# Patient Record
Sex: FEMALE | Race: WHITE
[De-identification: names, ages, dates, MRNs, and addresses within clinical notes are randomized per-mention and may not be internally consistent; named-entity substitution may affect disease eponyms.]

---

## 2019-11-16 ENCOUNTER — HOSPITAL ENCOUNTER (INPATIENT)
Dept: HOSPITAL 1 - ED | Age: 44
LOS: 1 days | Discharge: HOME | DRG: 445 | End: 2019-11-17
Attending: HOSPITALIST | Admitting: HOSPITALIST
Payer: COMMERCIAL

## 2019-11-16 VITALS
WEIGHT: 174.25 LBS | BODY MASS INDEX: 32.07 KG/M2 | WEIGHT: 174.25 LBS | BODY MASS INDEX: 32.07 KG/M2 | HEIGHT: 62 IN | HEIGHT: 62 IN

## 2019-11-16 VITALS — SYSTOLIC BLOOD PRESSURE: 116 MMHG | DIASTOLIC BLOOD PRESSURE: 69 MMHG

## 2019-11-16 VITALS — DIASTOLIC BLOOD PRESSURE: 77 MMHG | SYSTOLIC BLOOD PRESSURE: 116 MMHG

## 2019-11-16 VITALS — DIASTOLIC BLOOD PRESSURE: 70 MMHG | SYSTOLIC BLOOD PRESSURE: 131 MMHG

## 2019-11-16 DIAGNOSIS — E66.01: ICD-10-CM

## 2019-11-16 DIAGNOSIS — N39.0: ICD-10-CM

## 2019-11-16 DIAGNOSIS — N20.0: ICD-10-CM

## 2019-11-16 DIAGNOSIS — K80.20: Primary | ICD-10-CM

## 2019-11-16 LAB
ALBUMIN SERPL-MCNC: 3.5 G/DL (ref 3.4–5)
ALP SERPL-CCNC: 64 U/L (ref 46–116)
ALT SERPL-CCNC: 33 U/L (ref 14–59)
AST SERPL-CCNC: 15 U/L (ref 15–37)
BASOPHILS NFR BLD: 0.5 % (ref 0–2)
BILIRUB SERPL-MCNC: 0.68 MG/DL (ref 0.2–1)
BUN SERPL-MCNC: 22 MG/DL (ref 7–18)
CALCIUM SERPL-MCNC: 8.5 MG/DL (ref 8.5–10.1)
CHLORIDE SERPL-SCNC: 105 MMOL/L (ref 98–107)
CHOLEST SERPL-MCNC: 191 MG/DL (ref ?–200)
CHOLEST/HDLC SERPL: 4.4 MG/DL
CO2 SERPL-SCNC: 25.4 MMOL/L (ref 21–32)
CREAT SERPL-MCNC: 0.8 MG/DL (ref 0.6–1)
ERYTHROCYTE [DISTWIDTH] IN BLOOD BY AUTOMATED COUNT: 13.7 % (ref 11.5–14.5)
GFR SERPLBLD BASED ON 1.73 SQ M-ARVRAT: > 60 ML/MIN
GLUCOSE SERPL-MCNC: 114 MG/DL (ref 74–106)
HDLC SERPL-MCNC: 43 MG/DL (ref 40–60)
MICROSCOPIC UR-IMP: YES
PLATELET # BLD: 347 X10^3MCL (ref 130–400)
POTASSIUM SERPL-SCNC: 3.8 MMOL/L (ref 3.5–5.1)
PROT SERPL-MCNC: 7.3 G/DL (ref 6.4–8.2)
RBC # UR STRIP.AUTO: (no result) /UL
SODIUM SERPL-SCNC: 142 MMOL/L (ref 136–145)
TRIGL SERPL-MCNC: 105 MG/DL (ref ?–150)
UA SPECIFIC GRAVITY: >=1.03 (ref 1–1.03)

## 2019-11-16 PROCEDURE — G0378 HOSPITAL OBSERVATION PER HR: HCPCS

## 2019-11-16 PROCEDURE — C9113 INJ PANTOPRAZOLE SODIUM, VIA: HCPCS

## 2019-11-16 NOTE — NUR
RECEIVED PT FROM PREVIOUS SHIFT. PT A/OX4. DENIES SOB ON RA. C/O 10/10 PAIN.
DR MADE AWARE. IV PATENT, INFUSING WELL WITH NO S/S OF INFILTRATION. 
AT BEDSIDE. CALL LIGHT WITHIN REACH, BED IN LOW POSITION. WILL CONTINUE TO
MONITOR.

## 2019-11-16 NOTE — NUR
RECEIVED PT FROM ED. PT AOX4, FOLLOWS COMMANDS, SPEECH CLEAR, PERRLA, DENIES
HEADACHE, DROWSY AT THIS TIME DUE TO PAIN MEDS RECEIVED IN ED. RESP E/U ON RA,
EQUAL CHEST RISE, LUNG SOUNDS CTA, DENIES SOB. HR: 73, S1 AND S2 WNL, DENIES
CHEST PAIN OR PALPITATIONS. ABD SOFT/ROUND/NONTENDER, BS ACTIVE, DENIES N/V.
PT C/O OF PAIN TO R FLANK 6/10, MEDICATED AS ORDERED PER EMAR, DENIES PAINFUL
OR BURNING URINE. SMALL SX INCISION TO L FOOT W/ CLEAR DRESSING CDI, HX OF
FOOT SX ONE MONTH AGO. IV TO LAC W/ NO ERYTHEMA OR EDEMA. BED IN LOWEST
POSITION AND CALL LIGHT WITHIN REACH. WILL CONTINUE TO MONITOR.

## 2019-11-16 NOTE — NUR
PATIENT RESTING IN BED WITH C/O INTERMITTENT SHARP PAIN IN RIGHT LOWER BACK. MD
IFORMED O PATIENT STAUS. ADVISED HE WILL REVIEW CT RESULTS. PATIENT INFORMED OF
MD PLAN OF CARE. VSS. FAMILY AT BEDSIDE. BED LOW AND IN LOCKED POSITION WITH
SIDE RAILS UP X 2. CALL BELL IN REACH. CM NSR WITH NO ECTOPY NOTED. WILL
CONTINUE TO MONTTOR FOR CHANGES.

## 2019-11-16 NOTE — NUR
PT C/O 10/10 PAIN TO R FLANK. ORDER RECEIVED FOR MORPHINE 2MG IVP. MEDICATION
ADMINISTERED PRN PER EMAR. WILL CONTINUE TO MONITOR.

## 2019-11-17 VITALS — DIASTOLIC BLOOD PRESSURE: 63 MMHG | SYSTOLIC BLOOD PRESSURE: 95 MMHG

## 2019-11-17 VITALS — SYSTOLIC BLOOD PRESSURE: 95 MMHG | DIASTOLIC BLOOD PRESSURE: 57 MMHG

## 2019-11-17 LAB
BASOPHILS NFR BLD: 0.5 % (ref 0–2)
BUN SERPL-MCNC: 15 MG/DL (ref 7–18)
CALCIUM SERPL-MCNC: 7.7 MG/DL (ref 8.5–10.1)
CHLORIDE SERPL-SCNC: 105 MMOL/L (ref 98–107)
CO2 SERPL-SCNC: 25.7 MMOL/L (ref 21–32)
CREAT SERPL-MCNC: 0.8 MG/DL (ref 0.6–1)
ERYTHROCYTE [DISTWIDTH] IN BLOOD BY AUTOMATED COUNT: 13.4 % (ref 11.5–14.5)
GFR SERPLBLD BASED ON 1.73 SQ M-ARVRAT: > 60 ML/MIN
GLUCOSE SERPL-MCNC: 100 MG/DL (ref 74–106)
PLATELET # BLD: 284 X10^3MCL (ref 130–400)
POTASSIUM SERPL-SCNC: 3.5 MMOL/L (ref 3.5–5.1)
SODIUM SERPL-SCNC: 139 MMOL/L (ref 136–145)

## 2019-11-17 NOTE — NUR
PT RESTING IN NO ACUTE DISTRESS. RR EVEN AND UNLABORED. IV PATENT AND INFUSING
WELL WITH NO S/S OF INFILTRATION.  AT BEDSIDE. CALL LIGHT WITHIN REACH,
BED IN LOW POSITION. WILL CONTINUE TO MONITOR.

## 2019-11-17 NOTE — NUR
PT DISCHARGED. REVIEWED DISCHARGE PACKET W/ PT INCLUDING NEW RX MEDS AND
FOLLOW UP INSTRUCTIONS. PT AOX4, RESP E/U ON RA, VS STABLE, DENIES PAIN. IV TO
LAC REMOVED, CATH INTACT, GAUZE DRESSING APPLIED. PT AMBULATORY TO [facility],
ESCORTED BY  AND CNA LIN W/ NO ACUTE INCIDENCE.

## 2019-11-17 NOTE — NUR
RECEIVED PT FROM NIGHT SHIFT NURSE. PT IN BED SLEEPING, AROUSABLE, RESP E/U ON
RA. NO SIGNS OF ACUTE DISTRESS NOTED. IV TO LAC W/ NO SIGNS OF INFILTRATION,
IVF INFUSING WELL. BED IN LOWEST POSITION AND CALL LIGHT WITHIN REACH. PT
 AT BEDISDE. WILL CONTINUE TO MONITOR.

## 2020-03-29 ENCOUNTER — HOSPITAL ENCOUNTER (EMERGENCY)
Dept: HOSPITAL 1 - ED | Age: 45
Discharge: HOME | End: 2020-03-29
Payer: COMMERCIAL

## 2020-03-29 VITALS — SYSTOLIC BLOOD PRESSURE: 128 MMHG | DIASTOLIC BLOOD PRESSURE: 89 MMHG

## 2020-03-29 VITALS
BODY MASS INDEX: 33.68 KG/M2 | HEIGHT: 62 IN | HEIGHT: 62 IN | WEIGHT: 183 LBS | BODY MASS INDEX: 33.68 KG/M2 | WEIGHT: 183 LBS

## 2020-03-29 DIAGNOSIS — R07.89: ICD-10-CM

## 2020-03-29 DIAGNOSIS — R09.1: Primary | ICD-10-CM

## 2020-08-19 NOTE — NUR
PT IN BED SLEEPING, AROUSABLE, RESP E/U ON RA. NO SIGNS OF ACUTE DISTRESS
NOTED. IV TO LAC W/ NO SIGNS OF INFILTRATION, IVF INFUSING WELL. BED IN LOWEST
POSITION AND CALL LIGHT WITHIN REACH. WILL ENDORSE TO ONCOMING NURSE. 9.27 (2PC)